# Patient Record
Sex: MALE | Race: BLACK OR AFRICAN AMERICAN | URBAN - METROPOLITAN AREA
[De-identification: names, ages, dates, MRNs, and addresses within clinical notes are randomized per-mention and may not be internally consistent; named-entity substitution may affect disease eponyms.]

---

## 2017-04-05 ENCOUNTER — EMERGENCY (EMERGENCY)
Facility: HOSPITAL | Age: 57
LOS: 1 days | Discharge: HOME | End: 2017-04-05
Attending: EMERGENCY MEDICINE | Admitting: EMERGENCY MEDICINE
Payer: MEDICAID

## 2017-04-05 VITALS
DIASTOLIC BLOOD PRESSURE: 80 MMHG | HEART RATE: 78 BPM | SYSTOLIC BLOOD PRESSURE: 136 MMHG | TEMPERATURE: 98 F | RESPIRATION RATE: 18 BRPM | OXYGEN SATURATION: 99 %

## 2017-04-05 VITALS
RESPIRATION RATE: 17 BRPM | DIASTOLIC BLOOD PRESSURE: 99 MMHG | OXYGEN SATURATION: 100 % | HEART RATE: 80 BPM | SYSTOLIC BLOOD PRESSURE: 149 MMHG

## 2017-04-05 LAB
ALBUMIN SERPL ELPH-MCNC: 4.4 G/DL — SIGNIFICANT CHANGE UP (ref 3.3–5)
ALP SERPL-CCNC: 67 U/L — SIGNIFICANT CHANGE UP (ref 40–120)
ALT FLD-CCNC: 26 U/L — SIGNIFICANT CHANGE UP (ref 4–41)
APTT BLD: 48 SEC — HIGH (ref 27.5–37.4)
AST SERPL-CCNC: 29 U/L — SIGNIFICANT CHANGE UP (ref 4–40)
BASOPHILS # BLD AUTO: 0.02 K/UL — SIGNIFICANT CHANGE UP (ref 0–0.2)
BASOPHILS NFR BLD AUTO: 0.4 % — SIGNIFICANT CHANGE UP (ref 0–2)
BILIRUB SERPL-MCNC: 0.4 MG/DL — SIGNIFICANT CHANGE UP (ref 0.2–1.2)
BUN SERPL-MCNC: 14 MG/DL — SIGNIFICANT CHANGE UP (ref 7–23)
CALCIUM SERPL-MCNC: 9.1 MG/DL — SIGNIFICANT CHANGE UP (ref 8.4–10.5)
CHLORIDE SERPL-SCNC: 105 MMOL/L — SIGNIFICANT CHANGE UP (ref 98–107)
CO2 SERPL-SCNC: 23 MMOL/L — SIGNIFICANT CHANGE UP (ref 22–31)
CREAT SERPL-MCNC: 1.17 MG/DL — SIGNIFICANT CHANGE UP (ref 0.5–1.3)
EOSINOPHIL # BLD AUTO: 0.16 K/UL — SIGNIFICANT CHANGE UP (ref 0–0.5)
EOSINOPHIL NFR BLD AUTO: 3 % — SIGNIFICANT CHANGE UP (ref 0–6)
GLUCOSE SERPL-MCNC: 89 MG/DL — SIGNIFICANT CHANGE UP (ref 70–99)
HCT VFR BLD CALC: 40.1 % — SIGNIFICANT CHANGE UP (ref 39–50)
HGB BLD-MCNC: 12.4 G/DL — LOW (ref 13–17)
IMM GRANULOCYTES NFR BLD AUTO: 0.4 % — SIGNIFICANT CHANGE UP (ref 0–1.5)
INR BLD: 2.45 — HIGH (ref 0.88–1.17)
LYMPHOCYTES # BLD AUTO: 1.82 K/UL — SIGNIFICANT CHANGE UP (ref 1–3.3)
LYMPHOCYTES # BLD AUTO: 34 % — SIGNIFICANT CHANGE UP (ref 13–44)
MCHC RBC-ENTMCNC: 22.9 PG — LOW (ref 27–34)
MCHC RBC-ENTMCNC: 30.9 % — LOW (ref 32–36)
MCV RBC AUTO: 74.1 FL — LOW (ref 80–100)
MONOCYTES # BLD AUTO: 0.44 K/UL — SIGNIFICANT CHANGE UP (ref 0–0.9)
MONOCYTES NFR BLD AUTO: 8.2 % — SIGNIFICANT CHANGE UP (ref 2–14)
NEUTROPHILS # BLD AUTO: 2.9 K/UL — SIGNIFICANT CHANGE UP (ref 1.8–7.4)
NEUTROPHILS NFR BLD AUTO: 54 % — SIGNIFICANT CHANGE UP (ref 43–77)
PLATELET # BLD AUTO: 177 K/UL — SIGNIFICANT CHANGE UP (ref 150–400)
PMV BLD: SIGNIFICANT CHANGE UP FL (ref 7–13)
POTASSIUM SERPL-MCNC: 4.1 MMOL/L — SIGNIFICANT CHANGE UP (ref 3.5–5.3)
POTASSIUM SERPL-SCNC: 4.1 MMOL/L — SIGNIFICANT CHANGE UP (ref 3.5–5.3)
PROT SERPL-MCNC: 7.6 G/DL — SIGNIFICANT CHANGE UP (ref 6–8.3)
PROTHROM AB SERPL-ACNC: 28 SEC — HIGH (ref 9.8–13.1)
RBC # BLD: 5.41 M/UL — SIGNIFICANT CHANGE UP (ref 4.2–5.8)
RBC # FLD: 17 % — HIGH (ref 10.3–14.5)
SODIUM SERPL-SCNC: 144 MMOL/L — SIGNIFICANT CHANGE UP (ref 135–145)
WBC # BLD: 5.36 K/UL — SIGNIFICANT CHANGE UP (ref 3.8–10.5)
WBC # FLD AUTO: 5.36 K/UL — SIGNIFICANT CHANGE UP (ref 3.8–10.5)

## 2017-04-05 PROCEDURE — 99283 EMERGENCY DEPT VISIT LOW MDM: CPT

## 2017-04-05 PROCEDURE — 93971 EXTREMITY STUDY: CPT | Mod: 26

## 2017-04-05 NOTE — ED PROVIDER NOTE - CHPI ED SYMPTOMS NEG
no stiffness/no tingling/no weakness/no difficulty bearing weight/no deformity/no bruising/no fever/no back pain/no abrasion/no numbness

## 2017-04-05 NOTE — ED ADULT TRIAGE NOTE - CHIEF COMPLAINT QUOTE
Patient brought to ER from home by EMS for c/o right arm pain for three days. From elbow to chest and pt is on Coumadin for a previous DVT.

## 2017-04-05 NOTE — ED PROVIDER NOTE - MUSCULOSKELETAL, MLM
Right arm FROM, NVI, Sensate intact, non tender through out, no swelling/erythema, no evidence of laceration or abrasion, Spine appears normal, range of motion is not limited, no muscle or joint tenderness

## 2017-04-05 NOTE — ED PROVIDER NOTE - OBJECTIVE STATEMENT
55 yo M pmhx of RLE DVT, Stroke with L sided residual weakness, HTN, HLD here for right arm pain x 4 months. Pt states he has had pain in his right arm x 4 months. Seen at [facility] for the same as per patient. Pt on coumadin for DVT. Otherwise pt is well. Denies injury or trauma. Denies taking meds for pain. Denies fever, vomiting, CP, SOB, new weakness, abdominal pain, numbness/tingling, HA, dizziness.

## 2017-04-05 NOTE — ED PROVIDER NOTE - MEDICAL DECISION MAKING DETAILS
55 yo M with r arm pain x 4 months. Pain appears to be chronic in nature. No direct injury/ trauma that patient can recall. pt is a poor historian on exam and has been worked up by several other hospitals for this pain. Will check INR and do ultrasound and r/o occlusion.

## 2017-04-05 NOTE — ED PROVIDER NOTE - ATTENDING CONTRIBUTION TO CARE
AJM: Patient see with PA and agree with above note. Pt is a 56 year old male with history of CVA with left sided weakness, coumadin for DVT, presents with chronic right arm pain. Symptoms began 3-4 months ago. No trauma. No weakness/numbness. Pain is intermittent. No ttp on exam. No redness or swelling of arm. FROM NV intact. will obtain sono to r/p UE dvt. check inr

## 2017-04-05 NOTE — ED PROVIDER NOTE - CARE PLAN
Principal Discharge DX:	Arm pain  Instructions for follow-up, activity and diet:	Follow up with orthopedic doctor. Follow up with PMD. Rest, drink plenty of fluids.  Advance activity as tolerated.  Continue all previously prescribed medications as directed. You can use motrin 600mg every 6-8 hours for pain or fever, and/or Tylenol 650 mg every 4 hours for pain/fever. Follow up with your primary care physician in 48-72 hours- bring copies of your results.  Return to the emergency department for chest pain, shortness of breath, dizziness, or worsening, concerning, or persistent symptoms.

## 2017-04-05 NOTE — ED PROVIDER NOTE - PLAN OF CARE
Follow up with orthopedic doctor. Follow up with PMD. Rest, drink plenty of fluids.  Advance activity as tolerated.  Continue all previously prescribed medications as directed. You can use motrin 600mg every 6-8 hours for pain or fever, and/or Tylenol 650 mg every 4 hours for pain/fever. Follow up with your primary care physician in 48-72 hours- bring copies of your results.  Return to the emergency department for chest pain, shortness of breath, dizziness, or worsening, concerning, or persistent symptoms.

## 2017-04-05 NOTE — ED ADULT NURSE NOTE - OBJECTIVE STATEMENT
pt received to room #25 with c/o of Right arm pain. pt states "I feel like my vein snapped"  pt found to be aox2, poor historian. pt paralyzed on left side, arrives with cane. as per pt, aide arrived with pt, but went to get food.  denies cp, sob, n/v/d. states pain in located on right bicep. IV placed, labs drawn and sent. vss, nad. pending MD ferreira, will cont to monitor.

## 2017-04-05 NOTE — ED PROVIDER NOTE - PROGRESS NOTE DETAILS
MARSHALL Landrum: ultrasound showed no DVT or occlusion. INR therapeutic. No concern for cardiac etiology 2/2 to length of symptoms and absence of CP/SOB or other symptoms. Likely chronic arm pain, will d/c pt with ortho follow up and pmd follow up pt agrees with plan. MARSHALL Landrum: spoke with patient, states he needs ambulette transport home. Pt states that access care typically takes him to and from hospital/doctor appts. Spoke with SW, will arrange for transport.

## 2018-07-26 VITALS
WEIGHT: 263.01 LBS | SYSTOLIC BLOOD PRESSURE: 145 MMHG | DIASTOLIC BLOOD PRESSURE: 63 MMHG | HEIGHT: 74 IN | HEART RATE: 87 BPM | TEMPERATURE: 98 F | OXYGEN SATURATION: 95 % | RESPIRATION RATE: 18 BRPM

## 2018-07-26 RX ORDER — CHLORHEXIDINE GLUCONATE 213 G/1000ML
1 SOLUTION TOPICAL ONCE
Qty: 0 | Refills: 0 | Status: DISCONTINUED | OUTPATIENT
Start: 2018-07-27 | End: 2018-07-28

## 2018-07-26 NOTE — H&P ADULT - ASSESSMENT
Patient is a wheelchair bound POOR HISTORIAN 57 y/o male former smoker and former alcohol abuse with PMHx of obesity, HTN, HLD, CKD stage 3, CVA with left sided weakness, hx of DVT (on warfarin last dose 07/24/2018), borderline hyperglycemia who presents for cardiac catheterization secondary to CCS III anginal equivalent symptoms in the setting of an abnormal stress test    ASA III Mallampati III  Precath consented  Loaded with Brilinta 180mg POx1 and ASA 325mg POx1 per Dr. Whitmore  Started IVF NS @       Risks & benefits of procedure and alternative therapy have been explained to the patient including but not limited to: allergic reaction, bleeding w/possible need for blood transfusion, infection, renal and vascular compromise, limb damage, arrhythmia, stroke, vessel dissection/perforation, Myocardial infarction, emergent CABG. Informed consent obtained and in chart.

## 2018-07-26 NOTE — H&P ADULT - NSHPSOCIALHISTORY_GEN_ALL_CORE
active smoker   Denies former smoker x1 years 1ppd, Quit 3 months ago  Former alcohol abuse 1 bottle of wine daily  Denies illicit drug use

## 2018-07-26 NOTE — H&P ADULT - PROBLEM SELECTOR PLAN 1
-INR 1.8 -INR 1.8 (on Warfarin), per Dr. Whitmore INR elevated case cancelled to be done tomorrow with Dr. Guerrier  -NPO after MN for cath with Dr. Guerrier in AM pt Loaded with Brilinta 180mg POx1 and ASA 325mg POx1. Precath consented   -Per Dr. Whitmore start on heparin gtt @ 6pm  -f/u INR in AM  -continue ASA 81mg QD and Brilinta 90mg BID  -continue simvastatin 20mg QHS    DVT PPx: heparin gtt  Dispo: NPO for cath in AM with Dr. Guerrier    Case d/w Dr. Whitmore

## 2018-07-26 NOTE — H&P ADULT - HISTORY OF PRESENT ILLNESS
Patient is a wheelchair bound 57 y/o male active smoker with PMHx of obesity, HTN, HLD, CKD stage 3, CVA with left sided weakness, hx of DVT, borderline hyperglycemia who presented to cardiologist Dr. Whitmore for pre-op clearance for dental surgery. Pt states ....    Pt denies CP, SOB, palpitations, syncope, dizziness, PND/orthopnea or LE edema    Stress test 06/08/2018: EF: 57%, small to moderate infarct in the inferolateral region with residual ischemia in the inferolateral region. ECHO 06/08/2018: EF 65-70%, no regional wall abnormalities, wall thickness mildly increased, mild TR    In light of pt's risk factors, CCS --- anginal symptoms, and abnormal stress test pt is referred for cardiac catheterization with possible intervention if clinically indicated to r/o CAD Patient is a wheelchair bound 57 y/o male former smoker with PMHx of obesity, HTN, HLD, CKD stage 3, CVA with left sided weakness, hx of DVT, borderline hyperglycemia who presented to cardiologist Dr. Whitmore for pre-op clearance for dental surgery. Pt states ....    Pt denies CP, SOB, palpitations, syncope, dizziness, PND/orthopnea or LE edema    Stress test 06/08/2018: EF: 57%, small to moderate infarct in the inferolateral region with residual ischemia in the inferolateral region. ECHO 06/08/2018: EF 65-70%, no regional wall abnormalities, wall thickness mildly increased, mild TR    In light of pt's risk factors, CCS --- anginal symptoms, and abnormal stress test pt is referred for cardiac catheterization with possible intervention if clinically indicated to r/o CAD Patient is a wheelchair bound POOR HISTORIAN 57 y/o male former smoker with PMHx of obesity, HTN, HLD, CKD stage 3, CVA with right sided weakness, hx of DVT, borderline hyperglycemia who presented to cardiologist Dr. Whitmore for pre-op clearance for dental surgery. Pt states ....    Pt denies CP, SOB, palpitations, syncope, dizziness, PND/orthopnea or LE edema    Stress test 06/08/2018: EF: 57%, small to moderate infarct in the inferolateral region with residual ischemia in the inferolateral region. ECHO 06/08/2018: EF 65-70%, no regional wall abnormalities, wall thickness mildly increased, mild TR    In light of pt's risk factors, CCS --- anginal symptoms, and abnormal stress test pt is referred for cardiac catheterization with possible intervention if clinically indicated to r/o CAD CONFIRM MEDS UPON ARRIVAL   CKD HYDRATION    Patient is a wheelchair bound POOR HISTORIAN 59 y/o male former smoker and former alcohol abuse with PMHx of obesity, HTN, HLD, CKD stage 3, CVA with right sided weakness, hx of DVT, borderline hyperglycemia who presented to cardiologist Dr. Whitmore for pre-op clearance for dental surgery. Pt states he experiences SALAS upon doing his exercises at home to strengthen his right side which is subsequently resolved with rest. Pt denies CP,  palpitations, syncope, dizziness, PND/orthopnea or LE edema. Stress test 06/08/2018: EF: 57%, small to moderate infarct in the inferolateral region with residual ischemia in the inferolateral region. ECHO 06/08/2018: EF 65-70%, no regional wall abnormalities, wall thickness mildly increased, mild TR    In light of pt's risk factors, CCS III anginal equivalent symptoms, and abnormal stress test pt is referred for cardiac catheterization with possible intervention if clinically indicated to r/o CAD Patient is a wheelchair bound POOR HISTORIAN 59 y/o male former smoker and former alcohol abuse with PMHx of obesity, HTN, HLD, CKD stage 3, CVA with right sided weakness, hx of DVT, borderline hyperglycemia who presented to cardiologist Dr. Whitmore for pre-op clearance for dental surgery. Pt states he experiences SALAS upon doing his exercises at home to strengthen his right side which is subsequently resolved with rest. Pt denies CP,  palpitations, syncope, dizziness, PND/orthopnea or LE edema. Stress test 06/08/2018: EF: 57%, small to moderate infarct in the inferolateral region with residual ischemia in the inferolateral region. ECHO 06/08/2018: EF 65-70%, no regional wall abnormalities, wall thickness mildly increased, mild TR    In light of pt's risk factors, CCS III anginal equivalent symptoms, and abnormal stress test pt is referred for cardiac catheterization with possible intervention if clinically indicated to r/o CAD Patient is a wheelchair bound POOR HISTORIAN 57 y/o male former smoker and former alcohol abuse with PMHx of obesity, HTN, HLD, CKD stage 3, CVA with left sided weakness, hx of DVT (on warfarin last dose 07/24/2018), borderline hyperglycemia who presented to cardiologist Dr. Whitmore for pre-op clearance for dental surgery. Pt states he experiences SALAS upon doing his exercises at home to strengthen his left side which is subsequently resolved with rest. Pt denies CP,  palpitations, syncope, dizziness, PND/orthopnea or LE edema. Stress test 06/08/2018: EF: 57%, small to moderate infarct in the inferolateral region with residual ischemia in the inferolateral region. ECHO 06/08/2018: EF 65-70%, no regional wall abnormalities, wall thickness mildly increased, mild TR    In light of pt's risk factors, CCS III anginal equivalent symptoms, and abnormal stress test pt is referred for cardiac catheterization with possible intervention if clinically indicated to r/o CAD

## 2018-07-26 NOTE — H&P ADULT - PMH
Borderline hyperglycemia    CKD (chronic kidney disease) stage 3, GFR 30-59 ml/min    CVA, old, hemiparesis    DVT, lower extremity    Hyperlipidemia    Obesity

## 2018-07-27 ENCOUNTER — INPATIENT (INPATIENT)
Facility: HOSPITAL | Age: 58
LOS: 0 days | Discharge: HOME | DRG: 287 | End: 2018-07-28
Attending: INTERNAL MEDICINE | Admitting: INTERNAL MEDICINE
Payer: MEDICAID

## 2018-07-27 DIAGNOSIS — N18.3 CHRONIC KIDNEY DISEASE, STAGE 3 (MODERATE): ICD-10-CM

## 2018-07-27 DIAGNOSIS — Z86.718 PERSONAL HISTORY OF OTHER VENOUS THROMBOSIS AND EMBOLISM: ICD-10-CM

## 2018-07-27 DIAGNOSIS — I25.10 ATHEROSCLEROTIC HEART DISEASE OF NATIVE CORONARY ARTERY WITHOUT ANGINA PECTORIS: ICD-10-CM

## 2018-07-27 DIAGNOSIS — I10 ESSENTIAL (PRIMARY) HYPERTENSION: ICD-10-CM

## 2018-07-27 DIAGNOSIS — R79.1 ABNORMAL COAGULATION PROFILE: ICD-10-CM

## 2018-07-27 DIAGNOSIS — I36.1 NONRHEUMATIC TRICUSPID (VALVE) INSUFFICIENCY: ICD-10-CM

## 2018-07-27 DIAGNOSIS — Z98.890 OTHER SPECIFIED POSTPROCEDURAL STATES: Chronic | ICD-10-CM

## 2018-07-27 DIAGNOSIS — E11.9 TYPE 2 DIABETES MELLITUS WITHOUT COMPLICATIONS: ICD-10-CM

## 2018-07-27 DIAGNOSIS — Z87.891 PERSONAL HISTORY OF NICOTINE DEPENDENCE: ICD-10-CM

## 2018-07-27 DIAGNOSIS — Z79.01 LONG TERM (CURRENT) USE OF ANTICOAGULANTS: ICD-10-CM

## 2018-07-27 DIAGNOSIS — I12.9 HYPERTENSIVE CHRONIC KIDNEY DISEASE WITH STAGE 1 THROUGH STAGE 4 CHRONIC KIDNEY DISEASE, OR UNSPECIFIED CHRONIC KIDNEY DISEASE: ICD-10-CM

## 2018-07-27 DIAGNOSIS — E78.5 HYPERLIPIDEMIA, UNSPECIFIED: ICD-10-CM

## 2018-07-27 LAB
ANION GAP SERPL CALC-SCNC: 12 MMOL/L — SIGNIFICANT CHANGE UP (ref 5–17)
APTT BLD: 46.3 SEC — HIGH (ref 27.5–37.4)
APTT BLD: 50.7 SEC — HIGH (ref 27.5–37.4)
BASOPHILS NFR BLD AUTO: 0.4 % — SIGNIFICANT CHANGE UP (ref 0–2)
BUN SERPL-MCNC: 13 MG/DL — SIGNIFICANT CHANGE UP (ref 7–23)
CALCIUM SERPL-MCNC: 9 MG/DL — SIGNIFICANT CHANGE UP (ref 8.4–10.5)
CHLORIDE SERPL-SCNC: 104 MMOL/L — SIGNIFICANT CHANGE UP (ref 96–108)
CHOLEST SERPL-MCNC: 174 MG/DL — SIGNIFICANT CHANGE UP (ref 10–199)
CK MB CFR SERPL CALC: 5 NG/ML — SIGNIFICANT CHANGE UP (ref 0–6.7)
CK SERPL-CCNC: 589 U/L — HIGH (ref 30–200)
CO2 SERPL-SCNC: 26 MMOL/L — SIGNIFICANT CHANGE UP (ref 22–31)
CREAT SERPL-MCNC: 1.09 MG/DL — SIGNIFICANT CHANGE UP (ref 0.5–1.3)
CRP SERPL-MCNC: 0.2 MG/DL — SIGNIFICANT CHANGE UP (ref 0–0.4)
EOSINOPHIL NFR BLD AUTO: 3 % — SIGNIFICANT CHANGE UP (ref 0–6)
GLUCOSE SERPL-MCNC: 116 MG/DL — HIGH (ref 70–99)
HBA1C BLD-MCNC: 6.6 % — HIGH (ref 4–5.6)
HCT VFR BLD CALC: 44.8 % — SIGNIFICANT CHANGE UP (ref 39–50)
HDLC SERPL-MCNC: 42 MG/DL — SIGNIFICANT CHANGE UP (ref 40–125)
HGB BLD-MCNC: 13.8 G/DL — SIGNIFICANT CHANGE UP (ref 13–17)
INR BLD: 1.8 — HIGH (ref 0.88–1.16)
LIPID PNL WITH DIRECT LDL SERPL: 95 MG/DL — SIGNIFICANT CHANGE UP
LYMPHOCYTES # BLD AUTO: 35.4 % — SIGNIFICANT CHANGE UP (ref 13–44)
MCHC RBC-ENTMCNC: 24 PG — LOW (ref 27–34)
MCHC RBC-ENTMCNC: 30.8 G/DL — LOW (ref 32–36)
MCV RBC AUTO: 77.8 FL — LOW (ref 80–100)
MONOCYTES NFR BLD AUTO: 7.2 % — SIGNIFICANT CHANGE UP (ref 2–14)
NEUTROPHILS NFR BLD AUTO: 54 % — SIGNIFICANT CHANGE UP (ref 43–77)
PLATELET # BLD AUTO: 134 K/UL — LOW (ref 150–400)
POTASSIUM SERPL-MCNC: 4.3 MMOL/L — SIGNIFICANT CHANGE UP (ref 3.5–5.3)
POTASSIUM SERPL-SCNC: 4.3 MMOL/L — SIGNIFICANT CHANGE UP (ref 3.5–5.3)
PROTHROM AB SERPL-ACNC: 20.2 SEC — HIGH (ref 9.8–12.7)
RBC # BLD: 5.76 M/UL — SIGNIFICANT CHANGE UP (ref 4.2–5.8)
RBC # FLD: 19 % — HIGH (ref 10.3–16.9)
SODIUM SERPL-SCNC: 142 MMOL/L — SIGNIFICANT CHANGE UP (ref 135–145)
TOTAL CHOLESTEROL/HDL RATIO MEASUREMENT: 4.1 RATIO — SIGNIFICANT CHANGE UP (ref 3.4–9.6)
TRIGL SERPL-MCNC: 185 MG/DL — HIGH (ref 10–149)
WBC # BLD: 4.7 K/UL — SIGNIFICANT CHANGE UP (ref 3.8–10.5)
WBC # FLD AUTO: 4.7 K/UL — SIGNIFICANT CHANGE UP (ref 3.8–10.5)

## 2018-07-27 PROCEDURE — 93010 ELECTROCARDIOGRAM REPORT: CPT

## 2018-07-27 PROCEDURE — 99223 1ST HOSP IP/OBS HIGH 75: CPT

## 2018-07-27 RX ORDER — FLUTICASONE PROPIONATE 50 MCG
1 SPRAY, SUSPENSION NASAL DAILY
Qty: 0 | Refills: 0 | Status: DISCONTINUED | OUTPATIENT
Start: 2018-07-27 | End: 2018-07-28

## 2018-07-27 RX ORDER — DEXTROSE 50 % IN WATER 50 %
12.5 SYRINGE (ML) INTRAVENOUS ONCE
Qty: 0 | Refills: 0 | Status: DISCONTINUED | OUTPATIENT
Start: 2018-07-27 | End: 2018-07-28

## 2018-07-27 RX ORDER — HEPARIN SODIUM 5000 [USP'U]/ML
9500 INJECTION INTRAVENOUS; SUBCUTANEOUS EVERY 6 HOURS
Qty: 0 | Refills: 0 | Status: DISCONTINUED | OUTPATIENT
Start: 2018-07-27 | End: 2018-07-28

## 2018-07-27 RX ORDER — HEPARIN SODIUM 5000 [USP'U]/ML
4500 INJECTION INTRAVENOUS; SUBCUTANEOUS EVERY 6 HOURS
Qty: 0 | Refills: 0 | Status: DISCONTINUED | OUTPATIENT
Start: 2018-07-27 | End: 2018-07-28

## 2018-07-27 RX ORDER — SIMETHICONE 80 MG/1
0.6 TABLET, CHEWABLE ORAL
Qty: 0 | Refills: 0 | COMMUNITY

## 2018-07-27 RX ORDER — HEPARIN SODIUM 5000 [USP'U]/ML
9500 INJECTION INTRAVENOUS; SUBCUTANEOUS ONCE
Qty: 0 | Refills: 0 | Status: COMPLETED | OUTPATIENT
Start: 2018-07-27 | End: 2018-07-27

## 2018-07-27 RX ORDER — INSULIN LISPRO 100/ML
VIAL (ML) SUBCUTANEOUS
Qty: 0 | Refills: 0 | Status: DISCONTINUED | OUTPATIENT
Start: 2018-07-27 | End: 2018-07-28

## 2018-07-27 RX ORDER — FLUTICASONE PROPIONATE 50 MCG
1 SPRAY, SUSPENSION NASAL
Qty: 0 | Refills: 0 | COMMUNITY

## 2018-07-27 RX ORDER — ASPIRIN/CALCIUM CARB/MAGNESIUM 324 MG
81 TABLET ORAL DAILY
Qty: 0 | Refills: 0 | Status: DISCONTINUED | OUTPATIENT
Start: 2018-07-27 | End: 2018-07-28

## 2018-07-27 RX ORDER — HEPARIN SODIUM 5000 [USP'U]/ML
INJECTION INTRAVENOUS; SUBCUTANEOUS
Qty: 25000 | Refills: 0 | Status: DISCONTINUED | OUTPATIENT
Start: 2018-07-27 | End: 2018-07-28

## 2018-07-27 RX ORDER — TICAGRELOR 90 MG/1
90 TABLET ORAL
Qty: 0 | Refills: 0 | Status: DISCONTINUED | OUTPATIENT
Start: 2018-07-27 | End: 2018-07-28

## 2018-07-27 RX ORDER — SODIUM CHLORIDE 9 MG/ML
500 INJECTION INTRAMUSCULAR; INTRAVENOUS; SUBCUTANEOUS
Qty: 0 | Refills: 0 | Status: DISCONTINUED | OUTPATIENT
Start: 2018-07-27 | End: 2018-07-28

## 2018-07-27 RX ORDER — DEXTROSE 50 % IN WATER 50 %
25 SYRINGE (ML) INTRAVENOUS ONCE
Qty: 0 | Refills: 0 | Status: DISCONTINUED | OUTPATIENT
Start: 2018-07-27 | End: 2018-07-28

## 2018-07-27 RX ORDER — SIMVASTATIN 20 MG/1
20 TABLET, FILM COATED ORAL AT BEDTIME
Qty: 0 | Refills: 0 | Status: DISCONTINUED | OUTPATIENT
Start: 2018-07-27 | End: 2018-07-28

## 2018-07-27 RX ORDER — TICAGRELOR 90 MG/1
180 TABLET ORAL ONCE
Qty: 0 | Refills: 0 | Status: COMPLETED | OUTPATIENT
Start: 2018-07-27 | End: 2018-07-27

## 2018-07-27 RX ORDER — ASPIRIN/CALCIUM CARB/MAGNESIUM 324 MG
325 TABLET ORAL ONCE
Qty: 0 | Refills: 0 | Status: COMPLETED | OUTPATIENT
Start: 2018-07-27 | End: 2018-07-27

## 2018-07-27 RX ORDER — SIMVASTATIN 20 MG/1
1 TABLET, FILM COATED ORAL
Qty: 0 | Refills: 0 | COMMUNITY

## 2018-07-27 RX ORDER — SODIUM CHLORIDE 9 MG/ML
1000 INJECTION, SOLUTION INTRAVENOUS
Qty: 0 | Refills: 0 | Status: DISCONTINUED | OUTPATIENT
Start: 2018-07-27 | End: 2018-07-28

## 2018-07-27 RX ORDER — GLUCAGON INJECTION, SOLUTION 0.5 MG/.1ML
1 INJECTION, SOLUTION SUBCUTANEOUS ONCE
Qty: 0 | Refills: 0 | Status: DISCONTINUED | OUTPATIENT
Start: 2018-07-27 | End: 2018-07-28

## 2018-07-27 RX ORDER — ACETAMINOPHEN 500 MG
650 TABLET ORAL ONCE
Qty: 0 | Refills: 0 | Status: COMPLETED | OUTPATIENT
Start: 2018-07-27 | End: 2018-07-27

## 2018-07-27 RX ORDER — INSULIN LISPRO 100/ML
VIAL (ML) SUBCUTANEOUS ONCE
Qty: 0 | Refills: 0 | Status: COMPLETED | OUTPATIENT
Start: 2018-07-27 | End: 2018-07-27

## 2018-07-27 RX ORDER — FAMOTIDINE 10 MG/ML
1 INJECTION INTRAVENOUS
Qty: 0 | Refills: 0 | COMMUNITY

## 2018-07-27 RX ORDER — DEXTROSE 50 % IN WATER 50 %
15 SYRINGE (ML) INTRAVENOUS ONCE
Qty: 0 | Refills: 0 | Status: DISCONTINUED | OUTPATIENT
Start: 2018-07-27 | End: 2018-07-28

## 2018-07-27 RX ADMIN — HEPARIN SODIUM 9500 UNIT(S): 5000 INJECTION INTRAVENOUS; SUBCUTANEOUS at 18:31

## 2018-07-27 RX ADMIN — SIMVASTATIN 20 MILLIGRAM(S): 20 TABLET, FILM COATED ORAL at 21:04

## 2018-07-27 RX ADMIN — Medication 325 MILLIGRAM(S): at 11:20

## 2018-07-27 RX ADMIN — HEPARIN SODIUM 2100 UNIT(S)/HR: 5000 INJECTION INTRAVENOUS; SUBCUTANEOUS at 18:31

## 2018-07-27 RX ADMIN — Medication 650 MILLIGRAM(S): at 18:50

## 2018-07-27 RX ADMIN — Medication 0: at 11:55

## 2018-07-27 RX ADMIN — SODIUM CHLORIDE 75 MILLILITER(S): 9 INJECTION INTRAMUSCULAR; INTRAVENOUS; SUBCUTANEOUS at 11:20

## 2018-07-27 RX ADMIN — Medication 650 MILLIGRAM(S): at 19:26

## 2018-07-27 RX ADMIN — SODIUM CHLORIDE 75 MILLILITER(S): 9 INJECTION INTRAMUSCULAR; INTRAVENOUS; SUBCUTANEOUS at 21:05

## 2018-07-27 RX ADMIN — Medication 2: at 17:20

## 2018-07-27 RX ADMIN — Medication 1 SPRAY(S): at 18:51

## 2018-07-27 RX ADMIN — TICAGRELOR 180 MILLIGRAM(S): 90 TABLET ORAL at 11:20

## 2018-07-28 ENCOUNTER — TRANSCRIPTION ENCOUNTER (OUTPATIENT)
Age: 58
End: 2018-07-28

## 2018-07-28 VITALS
DIASTOLIC BLOOD PRESSURE: 88 MMHG | OXYGEN SATURATION: 96 % | RESPIRATION RATE: 18 BRPM | TEMPERATURE: 98 F | SYSTOLIC BLOOD PRESSURE: 130 MMHG | HEART RATE: 88 BPM

## 2018-07-28 DIAGNOSIS — N18.3 CHRONIC KIDNEY DISEASE, STAGE 3 (MODERATE): ICD-10-CM

## 2018-07-28 DIAGNOSIS — R06.00 DYSPNEA, UNSPECIFIED: ICD-10-CM

## 2018-07-28 DIAGNOSIS — I82.409 ACUTE EMBOLISM AND THROMBOSIS OF UNSPECIFIED DEEP VEINS OF UNSPECIFIED LOWER EXTREMITY: ICD-10-CM

## 2018-07-28 DIAGNOSIS — Z02.9 ENCOUNTER FOR ADMINISTRATIVE EXAMINATIONS, UNSPECIFIED: ICD-10-CM

## 2018-07-28 LAB
ANION GAP SERPL CALC-SCNC: 14 MMOL/L — SIGNIFICANT CHANGE UP (ref 5–17)
APTT BLD: >200 SEC — CRITICAL HIGH (ref 27.5–37.4)
BUN SERPL-MCNC: 14 MG/DL — SIGNIFICANT CHANGE UP (ref 7–23)
CALCIUM SERPL-MCNC: 8.6 MG/DL — SIGNIFICANT CHANGE UP (ref 8.4–10.5)
CHLORIDE SERPL-SCNC: 105 MMOL/L — SIGNIFICANT CHANGE UP (ref 96–108)
CO2 SERPL-SCNC: 22 MMOL/L — SIGNIFICANT CHANGE UP (ref 22–31)
CREAT SERPL-MCNC: 1.03 MG/DL — SIGNIFICANT CHANGE UP (ref 0.5–1.3)
GLUCOSE SERPL-MCNC: 124 MG/DL — HIGH (ref 70–99)
HCT VFR BLD CALC: 42.4 % — SIGNIFICANT CHANGE UP (ref 39–50)
HCT VFR BLD CALC: 44 % — SIGNIFICANT CHANGE UP (ref 39–50)
HGB BLD-MCNC: 13.1 G/DL — SIGNIFICANT CHANGE UP (ref 13–17)
HGB BLD-MCNC: 13.9 G/DL — SIGNIFICANT CHANGE UP (ref 13–17)
INR BLD: 1.43 — HIGH (ref 0.88–1.16)
MAGNESIUM SERPL-MCNC: 1.9 MG/DL — SIGNIFICANT CHANGE UP (ref 1.6–2.6)
MCHC RBC-ENTMCNC: 23.9 PG — LOW (ref 27–34)
MCHC RBC-ENTMCNC: 24.5 PG — LOW (ref 27–34)
MCHC RBC-ENTMCNC: 30.9 G/DL — LOW (ref 32–36)
MCHC RBC-ENTMCNC: 31.6 G/DL — LOW (ref 32–36)
MCV RBC AUTO: 77.4 FL — LOW (ref 80–100)
MCV RBC AUTO: 77.6 FL — LOW (ref 80–100)
PLATELET # BLD AUTO: 120 K/UL — LOW (ref 150–400)
PLATELET # BLD AUTO: 121 K/UL — LOW (ref 150–400)
POTASSIUM SERPL-MCNC: 4.2 MMOL/L — SIGNIFICANT CHANGE UP (ref 3.5–5.3)
POTASSIUM SERPL-SCNC: 4.2 MMOL/L — SIGNIFICANT CHANGE UP (ref 3.5–5.3)
PROTHROM AB SERPL-ACNC: 16 SEC — HIGH (ref 9.8–12.7)
RBC # BLD: 5.48 M/UL — SIGNIFICANT CHANGE UP (ref 4.2–5.8)
RBC # BLD: 5.67 M/UL — SIGNIFICANT CHANGE UP (ref 4.2–5.8)
RBC # FLD: 18.7 % — HIGH (ref 10.3–16.9)
RBC # FLD: 19.1 % — HIGH (ref 10.3–16.9)
SODIUM SERPL-SCNC: 141 MMOL/L — SIGNIFICANT CHANGE UP (ref 135–145)
WBC # BLD: 4.7 K/UL — SIGNIFICANT CHANGE UP (ref 3.8–10.5)
WBC # BLD: 4.9 K/UL — SIGNIFICANT CHANGE UP (ref 3.8–10.5)
WBC # FLD AUTO: 4.7 K/UL — SIGNIFICANT CHANGE UP (ref 3.8–10.5)
WBC # FLD AUTO: 4.9 K/UL — SIGNIFICANT CHANGE UP (ref 3.8–10.5)

## 2018-07-28 PROCEDURE — 93458 L HRT ARTERY/VENTRICLE ANGIO: CPT | Mod: 26

## 2018-07-28 PROCEDURE — 99233 SBSQ HOSP IP/OBS HIGH 50: CPT

## 2018-07-28 RX ORDER — WARFARIN SODIUM 2.5 MG/1
1 TABLET ORAL
Qty: 0 | Refills: 0 | COMMUNITY

## 2018-07-28 RX ORDER — MAGNESIUM OXIDE 400 MG ORAL TABLET 241.3 MG
800 TABLET ORAL ONCE
Qty: 0 | Refills: 0 | Status: COMPLETED | OUTPATIENT
Start: 2018-07-28 | End: 2018-07-28

## 2018-07-28 RX ORDER — SODIUM CHLORIDE 9 MG/ML
500 INJECTION INTRAMUSCULAR; INTRAVENOUS; SUBCUTANEOUS
Qty: 0 | Refills: 0 | Status: DISCONTINUED | OUTPATIENT
Start: 2018-07-28 | End: 2018-07-28

## 2018-07-28 RX ORDER — METFORMIN HYDROCHLORIDE 850 MG/1
1 TABLET ORAL
Qty: 0 | Refills: 0 | COMMUNITY

## 2018-07-28 RX ORDER — WARFARIN SODIUM 2.5 MG/1
10 TABLET ORAL ONCE
Qty: 0 | Refills: 0 | Status: DISCONTINUED | OUTPATIENT
Start: 2018-07-28 | End: 2018-07-28

## 2018-07-28 RX ORDER — ACETAMINOPHEN 500 MG
650 TABLET ORAL ONCE
Qty: 0 | Refills: 0 | Status: COMPLETED | OUTPATIENT
Start: 2018-07-28 | End: 2018-07-28

## 2018-07-28 RX ADMIN — Medication 81 MILLIGRAM(S): at 13:04

## 2018-07-28 RX ADMIN — HEPARIN SODIUM 1700 UNIT(S)/HR: 5000 INJECTION INTRAVENOUS; SUBCUTANEOUS at 02:42

## 2018-07-28 RX ADMIN — HEPARIN SODIUM 0 UNIT(S)/HR: 5000 INJECTION INTRAVENOUS; SUBCUTANEOUS at 01:40

## 2018-07-28 RX ADMIN — SODIUM CHLORIDE 75 MILLILITER(S): 9 INJECTION INTRAMUSCULAR; INTRAVENOUS; SUBCUTANEOUS at 02:36

## 2018-07-28 RX ADMIN — SODIUM CHLORIDE 75 MILLILITER(S): 9 INJECTION INTRAMUSCULAR; INTRAVENOUS; SUBCUTANEOUS at 13:25

## 2018-07-28 RX ADMIN — TICAGRELOR 90 MILLIGRAM(S): 90 TABLET ORAL at 07:15

## 2018-07-28 RX ADMIN — Medication 650 MILLIGRAM(S): at 19:19

## 2018-07-28 RX ADMIN — Medication 1 SPRAY(S): at 13:04

## 2018-07-28 RX ADMIN — Medication 650 MILLIGRAM(S): at 19:55

## 2018-07-28 RX ADMIN — MAGNESIUM OXIDE 400 MG ORAL TABLET 800 MILLIGRAM(S): 241.3 TABLET ORAL at 13:04

## 2018-07-28 NOTE — PROGRESS NOTE ADULT - PROBLEM SELECTOR PLAN 1
currently asymptomatic, s/p diagnostic cath 7/28 revealing normal LM, normal LAD, luminal irregularities of LCx (small vessel), normal RCA, EF:normal.

## 2018-07-28 NOTE — DISCHARGE NOTE ADULT - INSTRUCTIONS
---You underwent a coronary angiogram and should wait 3 days before returning to ordinary activities.   ---The catheter from your groin was removed and you should remove the dressing in 24 hours.   ---You may shower once the dressing is removed, but avoid baths, hot tubs, or swimming for 5 days to prevent infection.   ---If you notice bleeding from the site, hardening and pain at the site, drainage or redness from the site, coolness/paleness of the extremity, swelling, or fever, please call [phone].

## 2018-07-28 NOTE — DISCHARGE NOTE ADULT - PLAN OF CARE
Resume anticoagulation and monitor INR level. - Restart Coumadin 10mg by mouth daily. Please follow-up with PCP on Monday 7/30/18 for an INR level check. Continue current medications - Continue Simvastatin 20mg by mouth daily You had a recent abnormal outpatient stress test and were referred to [facility] for a cardiac catheterization to rule out a blockage in the arteries which supply blood to the heart. - Your cardiac catheterization revealed you have normal coronary arteries with no evidence of significant blockages.

## 2018-07-28 NOTE — DISCHARGE NOTE ADULT - CARE PROVIDER_API CALL
Jovani Whitmore (MD), Cardiovascular Disease; Interventional Cardiology  [address] [place] [address]  Phone: [phone]  Fax: [phone]

## 2018-07-28 NOTE — PROGRESS NOTE ADULT - ASSESSMENT
58 yr old M with PMHx of HTN, hyperlipidemia, Stage III CKD, hx of CVA (residual left sided weakness), hx of DVT (on Coumadin) with CCS Angina Class III equivalent symptoms/abnormal NST s/p diagnostic cath 7/28 revealing nonobstructive coronaries. Patient now deemed medically stable for discharge and awaiting reinstatement of 24 hours HHA services.

## 2018-07-28 NOTE — DISCHARGE NOTE ADULT - HOSPITAL COURSE
58 yr old wheelchair bound M former smoker and former alcohol abuse with PMHx of HTN, hyperlipidemia, Stage III CKD, CVA with left sided weakness, hx of DVT (on warfarin), borderline DM who presented to cardiologist Dr. Whitmore for pre-op clearance for dental surgery. Pt states he experiences SALAS upon doing his exercises at home to strengthen his left side which is subsequently resolved with rest.  Stress test 06/08/2018: EF: 57%, small to moderate infarct in the inferolateral region with residual ischemia in the inferolateral region. ECHO 06/08/2018: EF 65-70%, no regional wall abnormalities, wall thickness mildly increased, mild TR. Patient subsequently referred to [facility] on 7/27/18 for cardiac catheterization, which was post poned due to elevated INR level of 1.8. Patient underwent cardiac catheterization on 7/28 after further decline in INR to 1.43. Cardiac catheterization revealed normal LM, normal LAD, luminal irregularities of LCx (small vessel), normal RCA, EF: normal.    BP: 120s-140s/70s        HR: 70s        RR: 16        Temp: 35.8C   O2 sat: 98% RA    GEN: well appearing male in NAD  NECK: supple, no JVD  PULM: CTA B/L  CV: [id number]  ABD: +BS, soft, NT/ND  EXT: warm well perfused, no pedal edema bilaterally  Right groin site: soft, NT, no hematoma/bruit, distal DP/PT pulses unchanged from baseline    Labs/telemetry reviewed, within normal limits    Patient deemed stable for discharge as per Dr. Lou and to follow-up with Dr. Whitmore in 2 weeks.    In light of pt's risk factors, CCS III anginal equivalent symptoms, and abnormal stress test pt is referred for cardiac catheterization with possible intervention if clinically indicated to r/o CAD

## 2018-07-28 NOTE — DISCHARGE NOTE ADULT - CARE PLAN
Principal Discharge DX:	Dyspnea  Goal:	You had a recent abnormal outpatient stress test and were referred to [facility] for a cardiac catheterization to rule out a blockage in the arteries which supply blood to the heart.  Assessment and plan of treatment:	- Your cardiac catheterization revealed you have normal coronary arteries with no evidence of significant blockages.  Secondary Diagnosis:	DVT, lower extremity  Goal:	Resume anticoagulation and monitor INR level.  Assessment and plan of treatment:	- Restart Coumadin 10mg by mouth daily. Please follow-up with PCP on Monday 7/30/18 for an INR level check.  Secondary Diagnosis:	Hyperlipidemia  Goal:	Continue current medications  Assessment and plan of treatment:	- Continue Simvastatin 20mg by mouth daily

## 2018-07-28 NOTE — PROVIDER CONTACT NOTE (CRITICAL VALUE NOTIFICATION) - ACTION/TREATMENT ORDERED:
Pt left the unit for cardiac cath accompanied by cath lab nurses with no heparin infusion, MARSHALL Randolph is aware

## 2018-07-28 NOTE — PROGRESS NOTE ADULT - SUBJECTIVE AND OBJECTIVE BOX
Interventional Cardiology PA Adult Progress Note      CC: " I feel fine, I want to go home."      Subjective Assessment: Patient seen and examined resting comfortably post cardiac catheterization. Patient denies current chest pain, SOB, dizziness, or groin access site pain      12 point review of system otherwise negative except per subjective      	  MEDICATIONS:  insulin lispro (HumaLOG) corrective regimen sliding scale   SubCutaneous Before meals and at bedtime  simvastatin 20 milliGRAM(s) Oral at bedtime  fluticasone propionate 50 MICROgram(s)/spray Nasal Spray 1 Spray(s) Both Nostrils daily  sodium chloride 0.9%. 500 milliLiter(s) IV Continuous <Continuous>  warfarin 10 milliGRAM(s) Oral once      	    [PHYSICAL EXAM:  TELEMETRY: no acute events  T(C): 35.8 (07-28-18 @ 06:00), Max: 36.6 (07-27-18 @ 22:18)  HR: 73 (07-28-18 @ 08:55) (71 - 81)  BP: 148/89 (07-28-18 @ 08:55) (121/74 - 148/89)  RR: 16 (07-28-18 @ 08:55) (15 - 16)  SpO2: 95% (07-28-18 @ 08:55) (95% - 98%)                        Appearance: well appearing male in NAD  HEENT:   NC/AT, moist mucous membranes	  Neck: Supple, no JVD  Cardiovascular: [id number]  Respiratory: CTA B/L  Gastrointestinal: obese, + BS, soft, NT/ND	  Skin: No rashes, No ecchymoses, No cyanosis  Extremities: warm well perfused, no pedal edema  Right groin: soft, NT, no hematoma/bruit, distal DP/PT pulses unchanged from baseline.  Neuro: A + O x 3, with waxing and wanning memory and concentration noted, strength III/V in left sided upper and lower extremities      LABS:	 	               13.9   4.9   )-----------( 120      ( 28 Jul 2018 08:12 )             44.0     07-28    141  |  105  |  14  ----------------------------<  124<H>  4.2   |  22  |  1.03    Ca    8.6      28 Jul 2018 08:12  Mg     1.9     07-28        PT/INR - ( 28 Jul 2018 08:12 )   PT: 16.0 sec;   INR: 1.43     PTT - ( 28 Jul 2018 08:12 )  PTT:>200.0 sec

## 2018-07-28 NOTE — DISCHARGE NOTE ADULT - MEDICATION SUMMARY - MEDICATIONS TO TAKE
I will START or STAY ON the medications listed below when I get home from the hospital:    warfarin 10 mg oral tablet  -- 1 tab(s) by mouth once a day    PLEASE RESUME TODAY 7/28/18  -- Indication: For DVT, lower extremity    metFORMIN 500 mg oral tablet  -- 1 tab(s) by mouth once a day    PLEASE HOLD METFORMIN FOR 48 HOURS, RESUME ON MONDAY 7/30/18  -- Indication: For Diabetes    simvastatin 20 mg oral tablet  -- 1 tab(s) by mouth once a day (at bedtime)  -- Indication: For Cholesterol    famotidine 40 mg oral tablet  -- 1 tab(s) by mouth once a day (at bedtime)  -- Indication: For GERD    simethicone 40 mg/0.6 mL oral liquid  -- 0.6 milliliter(s) by mouth 4 times a day (after meals and at bedtime)  -- Indication: For Gas pain    fluticasone 50 mcg/inh nasal spray  -- 1 spray(s) into nose once a day  -- Indication: For Nasal spray

## 2018-07-28 NOTE — DISCHARGE NOTE ADULT - PATIENT PORTAL LINK FT
You can access the [facility] Portal, offered by [facility], by registering with the following website: [email or web address]

## 2018-07-28 NOTE — PROVIDER CONTACT NOTE (CRITICAL VALUE NOTIFICATION) - BACKGROUND
Pt presented to cardiologist for pre op clearance for dental surgery. Pt verbalized dyspnea on exertion upon doing his exercise

## 2018-07-28 NOTE — PROGRESS NOTE ADULT - PROBLEM SELECTOR PLAN 2
INR 1.43 this AM, Coumadin held for cath, will be resumed at home dose of Coumadin 10mg PO tonight. F/U INR in AM.

## 2018-12-26 PROCEDURE — 86140 C-REACTIVE PROTEIN: CPT

## 2018-12-26 PROCEDURE — 82962 GLUCOSE BLOOD TEST: CPT

## 2018-12-26 PROCEDURE — C1760: CPT

## 2018-12-26 PROCEDURE — 85025 COMPLETE CBC W/AUTO DIFF WBC: CPT

## 2018-12-26 PROCEDURE — C1887: CPT

## 2018-12-26 PROCEDURE — 82550 ASSAY OF CK (CPK): CPT

## 2018-12-26 PROCEDURE — 85027 COMPLETE CBC AUTOMATED: CPT

## 2018-12-26 PROCEDURE — 83735 ASSAY OF MAGNESIUM: CPT

## 2018-12-26 PROCEDURE — 85610 PROTHROMBIN TIME: CPT

## 2018-12-26 PROCEDURE — 80061 LIPID PANEL: CPT

## 2018-12-26 PROCEDURE — 36415 COLL VENOUS BLD VENIPUNCTURE: CPT

## 2018-12-26 PROCEDURE — C1769: CPT

## 2018-12-26 PROCEDURE — 82553 CREATINE MB FRACTION: CPT

## 2018-12-26 PROCEDURE — C1894: CPT

## 2018-12-26 PROCEDURE — 80048 BASIC METABOLIC PNL TOTAL CA: CPT

## 2018-12-26 PROCEDURE — C1889: CPT

## 2018-12-26 PROCEDURE — 83036 HEMOGLOBIN GLYCOSYLATED A1C: CPT

## 2018-12-26 PROCEDURE — 85730 THROMBOPLASTIN TIME PARTIAL: CPT

## 2018-12-26 PROCEDURE — 93005 ELECTROCARDIOGRAM TRACING: CPT

## 2018-12-26 PROCEDURE — 94640 AIRWAY INHALATION TREATMENT: CPT

## 2022-12-08 NOTE — PATIENT PROFILE ADULT. - BLOOD AVOIDANCE/RESTRICTIONS, PROFILE
HYPERTENSION. ..  control at present:  BP is ok  medication plan:  No change  Lifestyle management:  get weight down, regular aerobic exercise, limit alcohol  Diet:  low sodium  discussed at length    Home BP cuff reads high   EKG  Done 8/2022
Mild proteinuria w/o microalbuminuria - not likely related to diabetes. Recheck prot/ creat ratio  Discussed that both HTN and obesity can contribute to this problem.
Now on atorva 20  LFT's ok 11/2022  Lipids today
none

## 2024-07-03 NOTE — PATIENT PROFILE ADULT. - AS SC BRADEN MOBILITY
Mother has mental illness patient does not know the diagnoses mother also has substance abuse issues.  (3) slightly limited